# Patient Record
Sex: MALE | Race: ASIAN | NOT HISPANIC OR LATINO | Employment: OTHER | URBAN - METROPOLITAN AREA
[De-identification: names, ages, dates, MRNs, and addresses within clinical notes are randomized per-mention and may not be internally consistent; named-entity substitution may affect disease eponyms.]

---

## 2017-07-31 ENCOUNTER — OFFICE VISIT (OUTPATIENT)
Dept: ORTHOPEDICS | Facility: CLINIC | Age: 42
End: 2017-07-31
Payer: COMMERCIAL

## 2017-07-31 VITALS
HEIGHT: 65 IN | SYSTOLIC BLOOD PRESSURE: 112 MMHG | WEIGHT: 155 LBS | DIASTOLIC BLOOD PRESSURE: 66 MMHG | BODY MASS INDEX: 25.83 KG/M2

## 2017-07-31 DIAGNOSIS — M25.521 RIGHT ELBOW PAIN: ICD-10-CM

## 2017-07-31 DIAGNOSIS — M77.01 MEDIAL EPICONDYLITIS OF RIGHT ELBOW: Primary | ICD-10-CM

## 2017-07-31 PROCEDURE — 99213 OFFICE O/P EST LOW 20 MIN: CPT | Performed by: PHYSICAL MEDICINE & REHABILITATION

## 2017-07-31 NOTE — PATIENT INSTRUCTIONS
We addressed the following today:    1. Right medial epicondylitis (golfer's elbow)    Activity modification as discussed  Hand therapy: [facility] - [phone]  Topical Treatments: Ice  Over the counter medication: Acetaminophen (Tylenol) 1000 mg every 6 hours with food (Maximum of 3000 mg/day)  Ibuprofen (Advil) maximum of 800 mg four times a day with food  Other specific instructions: Obtain over-the-counter elbow strap for further treatment purposes  Follow up in 6 weeks if no improvement of symptoms for further evaluation/medical care (sooner if needed; call direct clinic number [[phone]] at any time with questions or concerns)

## 2017-07-31 NOTE — PROGRESS NOTES
"[facility]   Clinic Visit s Jul 31, 2017    Subjective:  Juan Diego Singh is a 42 year old right-hand dominant male, who is seen as self referral for evaluation of chronic right medial elbow pain.    Symptoms began 2 years ago.  Reports insidious onset without acute precipitating event.  Reports right elbow pain that is located medial with radiation absent.  Pain is 7/10 in maximal severity and 2/10 currently.  Symptoms are generally worse with broomball, golfing, throwing, and with work activities and better with ice.  Other treatment has consisted of compression sleeve with moderate relief.  Denies any consistent numbness/tingling/weakness of the right upper extremity.  Denies any catching or locking of the right elbow.  Denies any history of previous right elbow injuries/surgeries.    Patient's past medical, surgical, social, and family histories are reviewed today.  There are no significant contributory medical issues  Past Medical History:   Diagnosis Date     Other acne      Objective:  /66  Ht 5' 5\" (1.651 m)  Wt 155 lb (70.3 kg)  BMI 25.79 kg/m2  General: healthy, alert, and in no distress  Skin: no suspicious lesions or rashes  Psych: mentation appears normal and affect normal/bright  HEENT: no scleral icterus  CV: no pedal edema  Resp: normal respiratory effort without conversational dyspnea   Neuro: motor strength as noted below  Lymph: no palpable lymphadenopathy    MSK:    RIGHT ELBOW  Inspection:    No swelling, bruising, discoloration, or obvious deformity or asymmetry  Palpation:    No tenderness over the lateral epicondyle, common extensor tendon, medial epicondyle, common flexor tendon, supracondylar notch, olecranon bursa, distal bicep tendon, or radial head/neck  Active Range of Motion:     Extension normal / flexion normal / pronation normal / supination normal  Strength:    Elbow flexion/extension - 5/5  Special Tests:    Positive: Pain with resisted wrist flexion, " passive valgus stress, and pain with resisted pronation    Negative: Pain with resisted wrist extension, pain with resisted middle finger extension, and pain with resisted supination    Imaging:  No x-rays indicated during today's visit    ASSESSMENT:  1. Right medial epicondylosis    PLAN:  1. Formal hand therapy - eccentric wrist flexor/extensor strengthening/stretching and scapular stabilization exercises with use of modalities (kinesiotaping, iontophoresis, etc.) as needed with home exercise prescription.  2. Activity modification as discussed, including limitation of activities that cause pain/discomfort.  3. Acetaminophen/Ibuprofen/ice as needed for improved pain control.  4. Obtain an over-the-counter elbow strap as discussed for further treatment purposes.  5. Follow-up in 6 weeks for further evaluation/medical care.  If symptoms resolve completely, can follow-up as needed.  Consider plain radiographs of the right elbow, diagnostic musculoskeletal ultrasound of the right medial elbow region, initiation of Nitroglycerin patch, etc. as deemed appropriate moving forward.  Instructed to contact our office should the condition evolve or worsen.    Patient's conditions were thoroughly discussed during today's visit with greater than 50% of the visit spent counseling the patient with total time spent face-to-face with the patient being 15 minutes.    Johnny Hall DO, [facility]  [facility]    Disclaimer: This note consists of symbols derived from keyboarding, dictation and/or voice recognition software. As a result, there may be errors in the script that have gone undetected. Please consider this when interpreting information found in this chart.

## 2017-07-31 NOTE — NURSING NOTE
"Chief Complaint   Patient presents with     Musculoskeletal Problem     chronic R medial elbow pain       Initial /66  Ht 5' 5\" (1.651 m)  Wt 155 lb (70.3 kg)  BMI 25.79 kg/m2 Estimated body mass index is 25.79 kg/(m^2) as calculated from the following:    Height as of this encounter: 5' 5\" (1.651 m).    Weight as of this encounter: 155 lb (70.3 kg).  Medication Reconciliation: complete     Pedro Armstrong, ATC    "

## 2017-07-31 NOTE — MR AVS SNAPSHOT
After Visit Summary   7/31/2017    Juan Diego ERICKA Singh    MRN: [id number]           Patient Information     Date Of Birth          1975        Visit Information        Provider Department      7/31/2017 9:40 AM Johnny Hall DO [facility]        Today's Diagnoses     Medial epicondylitis of right elbow    -  1    Right elbow pain          Care Instructions    We addressed the following today:    1. Right medial epicondylitis (golfer's elbow)    Activity modification as discussed  Hand therapy: [facility] - [phone]  Topical Treatments: Ice  Over the counter medication: Acetaminophen (Tylenol) 1000 mg every 6 hours with food (Maximum of 3000 mg/day)  Ibuprofen (Advil) maximum of 800 mg four times a day with food  Other specific instructions: Obtain over-the-counter elbow strap for further treatment purposes  Follow up in 6 weeks if no improvement of symptoms for further evaluation/medical care (sooner if needed; call direct clinic number [[phone]] at any time with questions or concerns)              Follow-ups after your visit        Who to contact     If you have questions or need follow up information about today's clinic visit or your schedule please contact [facility] directly at [phone].  Normal or non-critical lab and imaging results will be communicated to you by Sinobpohart, letter or phone within 4 business days after the clinic has received the results. If you do not hear from us within 7 days, please contact the clinic through [employer] or phone. If you have a critical or abnormal lab result, we will notify you by phone as soon as possible.  Submit refill requests through [employer] or call your pharmacy and they will forward the refill request to us. Please allow 3 business days for your refill to be completed.          Additional Information About Your Visit        [employer] Information     [employer] lets you send messages to  "your doctor, view your test results, renew your prescriptions, schedule appointments and more. To sign up, go to [email or web address] . Click on \"Log in\" on the left side of the screen, which will take you to the Welcome page. Then click on \"Sign up Now\" on the right side of the page.     You will be asked to enter the access code listed below, as well as some personal information. Please follow the directions to create your username and password.     Your access code is: [email or web address]  Expires: 10/29/2017 10:15 AM     Your access code will  in 90 days. If you need help or a new code, please call your [facility] clinic or [phone].        Care EveryWhere ID     This is your Care EveryWhere ID. This could be used by other organizations to access your [facility] medical records  [id number]        Your Vitals Were     Height BMI (Body Mass Index)                5' 5\" (1.651 m) 25.79 kg/m2           Blood Pressure from Last 3 Encounters:   17 112/66   10/05/16 (!) 122/92   06/08/15 (!) 128/96    Weight from Last 3 Encounters:   17 155 lb (70.3 kg)   10/05/16 150 lb (68 kg)   06/08/15 155 lb (70.3 kg)              Today, you had the following     No orders found for display       Primary Care Provider Office Phone # Fax #    Marcie Dooleyet [phone] [address] [facility] [place] [address]        Equal Access to Services     [facility]: Hadii frankie valerao Sofiliberto, waaxda luqadaha, qaybta kaalmada adewil, luz baez . So [facility] [phone].    ATENCIÓN: Si habla español, tiene a casey disposición servicios gratuitos de asistencia lingüística. Llame al [phone].    We comply with applicable federal civil rights laws and [place] laws. We do not discriminate on the basis of race, color, national origin, age, disability sex, sexual orientation or gender identity.            Thank you!     Thank you for choosing [facility]  for your care. Our goal is always to provide you with excellent care. Hearing back from our patients is one way we can continue to improve our services. Please take a few minutes to complete the written survey that you may receive in the mail after your visit with us. Thank you!             Your Updated Medication List - Protect others around you: Learn how to safely use, store and throw away your medicines at www.disposemymeds.org.      Notice  As of 7/31/2017 10:15 AM    You have not been prescribed any medications.

## 2017-10-16 ENCOUNTER — THERAPY VISIT (OUTPATIENT)
Dept: OCCUPATIONAL THERAPY | Facility: CLINIC | Age: 42
End: 2017-10-16
Payer: COMMERCIAL

## 2017-10-16 DIAGNOSIS — M77.01 MEDIAL EPICONDYLITIS OF RIGHT ELBOW: ICD-10-CM

## 2017-10-16 DIAGNOSIS — M25.521 RIGHT ELBOW PAIN: ICD-10-CM

## 2017-10-16 PROCEDURE — 97165 OT EVAL LOW COMPLEX 30 MIN: CPT | Mod: GO | Performed by: OCCUPATIONAL THERAPIST

## 2017-10-16 PROCEDURE — 97110 THERAPEUTIC EXERCISES: CPT | Mod: GO | Performed by: OCCUPATIONAL THERAPIST

## 2017-10-16 PROCEDURE — 97140 MANUAL THERAPY 1/> REGIONS: CPT | Mod: GO | Performed by: OCCUPATIONAL THERAPIST

## 2017-10-16 NOTE — PROGRESS NOTES
Hand Therapy Initial Evaluation  Current Date:  10/16/2017    Subjective:  Juan Diego Singh is a 42 year old right hand dominant male.    Diagnosis: R MEP  DOI:  5+ years ago (MD order date 7/31/17)    Patient reports symptoms of pain, stiffness/loss of motion and numbness of the right elbow which occurred due to swinging motion with playing broom ball, golfing, work. Since onset symptoms are unchanged. Special tests:  none.  Previous treatment: arm sleeve, ice if using it a lot. General health as reported by patient is good.  Pertinent medical history includes: none.  Medical allergies: none.  Surgical history: orthopedic: ankle.  Medication history: none.    Occupational Profile Information:  Current occupation is j[employer]  Currently working in normal job without restrictions  Job Tasks: prolonged standing, repetitive tasks  Prior functional level:  no limitations  Barriers include:none  Mobility: No difficulty  Transportation: drives  Leisure activities/hobbies: broom ball, golf    Upper Extremity Functional Index Score:  SCORE:   Column Totals: /80: 65   (A lower score indicates greater disability.)    Objective:  Pain Level Report: On scale 0-10/10  Date 10/16/17    Side R    Overall 4    At Rest 0    With Activity 6      Primary Report: location and description  Date 10/16/2017    Side R    Location MEP    Radiation none    Pain Quality Dull ache    Frequency Intermittent    Duration Same throughout the day    Exacerbated by  Lifting, gripping,  pinching, pushing, biceps curls    Relieved by Ice, stretching, rest    Progression since onset Unchanged      Palaption: pain scale of 0-10/10  Date 10/16/2017    Side R    MEP 7-8    Flexor Wad Origin 8    PT 0      Palpation Ulnar Nerve Path: Pain level report on scale 0-10/10  Date 10/16/2017    Side R    [place] -    Cubital Tunnel -    [place] -    Subscapularis NT      Sensation: pt reports intermittent numbness on the ulnar volar forearm that occurs  at work after releasing and bringing arm back down after use    MMT: MMT scale of 0-5/5, pain scale of 0-10/10  Date 10/16/2017    Side R    Elbow flex in neutral 5/5, 0/10    Elbow flex in supination 5/5, 2/10    Wrist flexion 5/5, 7/10    Pronation 4/5, 9/10      STRENGTH: (Measured in pounds, pain scale 0-10/10)    Date 10/16/2017        Trials Left Right Left Right Left Right Left Right Left Right Left Right   1 87 87             2               3               Avg               Pain  0               Assessment:  Patient presents with symptoms consistent with diagnosis of right Medial Epicondylitis,  with conservative intervention.     Patient's limitations or Problem List includes:  Pain, Weakness, Decreased , Decreased coordination, Decreased dexterity and Tightness in musculature of the right elbow which interferes with the patient's ability to perform Self Care Tasks (dressing, eating, bathing, hygiene/toileting), Recreational Activities and Household Chores as compared to previous level of function.    Rehab Potential:  Excellent - Return to full activity, no limitations    Patient will benefit from skilled Occupational Therapy to increase flexibility, overall strength,  strength, forearm strength, coordination and dexterity and decrease pain to return to previous activity level and resume normal daily tasks and to reach their rehab potential.    Barriers to Learning:  No barrier    Communication Issues:  Patient appears to be able to clearly communicate and understand verbal and written communication and follow directions correctly.    Chart Review: Chart Review, Brief history including review of medical and/or therapy records relating to the presenting problem and Simple history review with patient    Assessment of Occupational Performance:  1-3 Performance Deficits  Identified Performance Deficits: home establishment and management, meal preparation and cleanup and leisure activities       Clinical Decision Making (Complexity): Low complexity    Treatment Explanation:  The following has been discussed with the patient:  RX ordered/plan of care  Anticipated outcomes  Possible risks and side effects    Plan:  Frequency:  1 X week, once daily  Duration:  for 6 weeks     Treatment Plan:    Modalities:  US   Therapeutic Exercise: AROM of elbow and wrist, PROM with stretch to wrist extensors and flexors, Isometrics, Eccentric Isotonics.   Manual Techniques: Joint mobilization and Myofascial release, interosseous membrane glide,  Myofascial release of the forearm extensors and flexors  Neuro Re-ed: Active/Passive ulnar nerve glides  Orthosis:  Static orthosis and Forearm based orthosis    Discharge Plan:  Achieve all LTG.  Independent in home treatment program.  Reach maximal therapeutic benefit.    Home Exercise Program:  Flexor and extensor wad stretches, pec stretch  MFR to flexors  Ulnar nerve glide    Next Visit:  MFR  Passive pec minor stretch  Passive ulnar nerve glides  Passive stretch

## 2017-10-17 PROBLEM — M25.521 RIGHT ELBOW PAIN: Status: ACTIVE | Noted: 2017-10-17

## 2017-10-17 PROBLEM — M77.01 MEDIAL EPICONDYLITIS OF RIGHT ELBOW: Status: ACTIVE | Noted: 2017-10-17

## 2017-10-30 ENCOUNTER — THERAPY VISIT (OUTPATIENT)
Dept: OCCUPATIONAL THERAPY | Facility: CLINIC | Age: 42
End: 2017-10-30
Payer: COMMERCIAL

## 2017-10-30 DIAGNOSIS — M25.521 RIGHT ELBOW PAIN: ICD-10-CM

## 2017-10-30 DIAGNOSIS — M77.01 MEDIAL EPICONDYLITIS OF RIGHT ELBOW: ICD-10-CM

## 2017-10-30 PROCEDURE — 97112 NEUROMUSCULAR REEDUCATION: CPT | Mod: GO | Performed by: OCCUPATIONAL THERAPIST

## 2017-10-30 PROCEDURE — 97140 MANUAL THERAPY 1/> REGIONS: CPT | Mod: GO | Performed by: OCCUPATIONAL THERAPIST

## 2017-10-30 PROCEDURE — 97110 THERAPEUTIC EXERCISES: CPT | Mod: GO | Performed by: OCCUPATIONAL THERAPIST

## 2017-11-13 ENCOUNTER — THERAPY VISIT (OUTPATIENT)
Dept: OCCUPATIONAL THERAPY | Facility: CLINIC | Age: 42
End: 2017-11-13
Payer: COMMERCIAL

## 2017-11-13 DIAGNOSIS — M77.01 MEDIAL EPICONDYLITIS OF RIGHT ELBOW: ICD-10-CM

## 2017-11-13 DIAGNOSIS — M25.521 RIGHT ELBOW PAIN: ICD-10-CM

## 2017-11-13 PROCEDURE — 97112 NEUROMUSCULAR REEDUCATION: CPT | Mod: GO | Performed by: OCCUPATIONAL THERAPIST

## 2017-11-13 PROCEDURE — 97110 THERAPEUTIC EXERCISES: CPT | Mod: GO | Performed by: OCCUPATIONAL THERAPIST

## 2017-11-13 PROCEDURE — 97140 MANUAL THERAPY 1/> REGIONS: CPT | Mod: GO | Performed by: OCCUPATIONAL THERAPIST

## 2018-02-12 ENCOUNTER — PRE VISIT (OUTPATIENT)
Dept: DERMATOLOGY | Facility: CLINIC | Age: 43
End: 2018-02-12

## 2018-02-12 NOTE — TELEPHONE ENCOUNTER
VM identifies patient. Left message for patient regarding upcoming appointment on 3-1-18 at 2:45pm.  Informed patient to bring an updated list of allergies, medications, pharmacy details and insurance information. Asked patient to bring any dermatology records from outside [place] or the [facility] or have them faxed to [phone].    Patient Reminders Given:  --Plan on being in our facility for approximately one hour, this includes the registration process, office visit, education and check-out process.    --We are located on the second floor of the building, check in desk D.   --If you need to cancel or reschedule, call [phone].  --We look forward to seeing you in Dermatology Clinic.     Lorri Jones LPN

## 2018-03-07 PROBLEM — M25.521 RIGHT ELBOW PAIN: Status: RESOLVED | Noted: 2017-10-17 | Resolved: 2018-03-07

## 2018-03-07 PROBLEM — M77.01 MEDIAL EPICONDYLITIS OF RIGHT ELBOW: Status: RESOLVED | Noted: 2017-10-17 | Resolved: 2018-03-07

## 2018-03-08 NOTE — PROGRESS NOTES
Pt has not returned for therapy since 11/13/17.  Assume all goals are met to pt satisfaction.  D/C [facility].

## 2024-06-14 ENCOUNTER — THERAPY VISIT (OUTPATIENT)
Dept: PHYSICAL THERAPY | Facility: CLINIC | Age: 49
End: 2024-06-14
Payer: COMMERCIAL

## 2024-06-14 DIAGNOSIS — M62.838 MUSCLE SPASM: ICD-10-CM

## 2024-06-14 DIAGNOSIS — M54.50 ACUTE BILATERAL LOW BACK PAIN WITHOUT SCIATICA: Primary | ICD-10-CM

## 2024-06-14 PROCEDURE — 97161 PT EVAL LOW COMPLEX 20 MIN: CPT | Mod: GP | Performed by: PHYSICAL THERAPIST

## 2024-06-14 PROCEDURE — 97140 MANUAL THERAPY 1/> REGIONS: CPT | Mod: GP | Performed by: PHYSICAL THERAPIST

## 2024-06-14 NOTE — PROGRESS NOTES
PHYSICAL THERAPY EVALUATION  Type of Visit: Evaluation       Fall Risk Screen:  Fall screen completed by: PT  Have you fallen 2 or more times in the past year?: No  Have you fallen and had an injury in the past year?: No  Is patient a fall risk?: No    Subjective       Presenting condition or subjective complaint: Injured back jogging to the point afterwarss i had touble walking, taking off clothes, and getting down and up.  Date of onset:      Relevant medical history:     Dates & types of surgery:      Prior diagnostic imaging/testing results: Other None   Prior therapy history for the same diagnosis, illness or injury: No      Prior Level of Function  Transfers: Independent  Ambulation: Independent  ADL: Independent      Living Environment  Social support: With a significant other or spouse   Type of home: House   Stairs to enter the home: No       Ramp: No   Stairs inside the home: Yes 12 Is there a railing: Yes     Help at home: None  Equipment owned:       Employment: Yes [employer]  Hobbies/Interests: Working out.  Golf.    Patient goals for therapy: Workout.  Golf  Present symptoms: patient reports pain located in the right low back .    Radiation:none  Type of pain is described as sharp on R, aching/tight .   Associated symptoms include: none (parasthesia)  Present since: Winter 2023, Spring 2024 with symptoms R side worsening, L side unchanging (improving/unchanging/worsening).  This condition is new/chronic (new/recurrent/chronic).  Symptoms commenced as a result of: running, felt he misstepped running; retriggered R after starting golfing in the last two months; reinjured this past Sunday just jogging  Condition occurred in the following environment: community   Symptoms at onset: R low back, now in L side  Constant symptoms:  Intermittent symptoms: all sx    Cough/Sneeze/Strain:+ sneeze    (with consideration for bending, sitting/rising, standing, walking, lying, am, as the day progresses, pm, when still,  on the move, other )  Symptoms are made worse with the following: standing, bending, twisting,    Symptoms are made better with the following: lying down on back,     Sleep disturbance: transitional movements are bothersome  Sleeping postures: back  Sleeping surface: firm (firm, soft, sag)       Objective   LUMBAR:    Posture:   Sitting: poor; Standing:fair; Lordosis: reduced  Posture Correction: better  Relevant Lateral Shift: nil    Neurological:    Motor Deficit:  Myotomes L R   L1-2 (hip flexion)     L3 (knee extension)     L4 (ankle DF)     L5 (g. toe ext)     S1 (ankle PF or knee flex)       Sensory Deficit and Reflexes: not tested    Dural Signs:   L R   Slump     SLR     Other:     AROM: (Major, Moderate, Minimal or Nil loss)  Movement Loss Kishor Mod Min Nil Pain   Flexion        Extension        Side Gliding R        Side Gliding L          Repeated movement testing:   (During: produces, abolishes, increases, decreases, no effect, centralizing, peripheralizing; After: better, worse, no better, no worse, no effect, centralized, peripheralized)    Pre-test Symptoms Standing:    Symptoms During Symptoms After ROM increased ROM decreased No Effect   FIS        Rep FIS        EIS        Rep EIS        Pre-test Symptoms Lying: painfree    Symptoms During Symptoms After ROM increased ROM decreased No Effect   LAURIE D NB      Rep LAURIE NE NE flex X R SG    EIL P pressure       Rep EIL NE NE  X flex    If required Pre-test Symptoms: painfree   Symptoms During Symptoms After ROM increased ROM decreased No Effect   SGIS - R P NW      Rep SGIS - R P NW   x   SGIS - L        Rep SGIS - L          Static Tests:   Other Tests: +post shear, +Gaenslen's (reduction of pain on R side); +spring    Provisional Classification: other/SIJ dysfunction  Principle of Management (education/equipment/mechanical therapy/specific principle): self correction R post inominate MET; shot gun, bridging       Assessment & Plan   CLINICAL  IMPRESSIONS  Medical Diagnosis: Acute bilateral low back pain without sciatica; muscle spasm    Treatment Diagnosis: SIJ dysfunction   Impression/Assessment: Patient is a 49 year old male with    complaints.  The following significant findings have been identified: Pain, Decreased ROM/flexibility, Decreased joint mobility, Decreased activity tolerance, and Impaired posture. These impairments interfere with their ability to perform work tasks, recreational activities, and community mobility as compared to previous level of function.     Clinical Decision Making (Complexity):  Clinical Presentation: Stable/Uncomplicated  Clinical Presentation Rationale: based on medical and personal factors listed in PT evaluation  Clinical Decision Making (Complexity): Low complexity    PLAN OF CARE  Treatment Interventions:  Interventions: Manual Therapy, Neuromuscular Re-education, Therapeutic Activity, Therapeutic Exercise    Long Term Goals     PT Goal 1  Goal Identifier: sitting  Goal Description: patient will sit in neutral posture (or with lumbar roll as needed) for 2+ hours painfree  Rationale: to maximize safety and independence within the community;to maximize safety and independence with transportation;to maximize safety and independence with self cares  Target Date: 07/26/24      Frequency of Treatment: 1 x week  Duration of Treatment: 8 weeks    Recommended Referrals to Other Professionals:   Education Assessment:        Risks and benefits of evaluation/treatment have been explained.   Patient/Family/caregiver agrees with Plan of Care.     Evaluation Time:     PT Eval, Low Complexity Minutes (15950): 20       Signing Clinician: Diana Multani PT

## 2024-06-17 ENCOUNTER — TRANSCRIBE ORDERS (OUTPATIENT)
Dept: OTHER | Age: 49
End: 2024-06-17

## 2024-06-17 DIAGNOSIS — M54.50 ACUTE BILATERAL LOW BACK PAIN WITHOUT SCIATICA: Primary | ICD-10-CM

## 2024-06-17 DIAGNOSIS — M62.838 MUSCLE SPASM: ICD-10-CM

## 2024-07-03 ENCOUNTER — THERAPY VISIT (OUTPATIENT)
Dept: PHYSICAL THERAPY | Facility: CLINIC | Age: 49
End: 2024-07-03
Payer: COMMERCIAL

## 2024-07-03 DIAGNOSIS — M54.50 ACUTE BILATERAL LOW BACK PAIN WITHOUT SCIATICA: Primary | ICD-10-CM

## 2024-07-03 PROCEDURE — 97110 THERAPEUTIC EXERCISES: CPT | Mod: GP | Performed by: PHYSICAL THERAPIST

## 2024-08-04 ENCOUNTER — HEALTH MAINTENANCE LETTER (OUTPATIENT)
Age: 49
End: 2024-08-04

## 2024-09-26 PROBLEM — M54.50 ACUTE BILATERAL LOW BACK PAIN WITHOUT SCIATICA: Status: RESOLVED | Noted: 2024-06-14 | Resolved: 2024-09-26

## 2024-09-26 NOTE — PROGRESS NOTES
"    DISCHARGE  Reason for Discharge: Patient has failed to schedule further appointments.    Equipment Issued:     Discharge Plan: Patient to continue home program.    Referring Provider:  Milton Borrego     07/03/24 0500   Appointment Info   Signing clinician's name / credentials Diana Multani PT Cert MDT   Total/Authorized Visits 6   Visits Used 2   Medical Diagnosis Acute bilateral low back pain without sciatica; muscle spasm   PT Tx Diagnosis SIJ dysfunction   Progress Note/Certification   Therapy Frequency 1 x week   Predicted Duration 8 weeks   PT Goal 1   Goal Identifier standing   Goal Description patient will be able to stand 4 hours painfree   Rationale to maximize safety and independence with performance of ADLs and functional tasks;to maximize safety and independence within the home;to maximize safety and independence within the community;to maximize safety and independence with self cares   Goal Progress increased stiffness standing at work   Target Date 07/26/24   Subjective Report   Subjective Report reports he feels overall better--feels he can't really tell if the exercises are helping. Worse with standing; Reports walking doesn't bother him as much; will get tight working out;   Objective Measures   Objective Measures Objective Measure 1;Objective Measure 2   Objective Measure 1   Objective Measure SIJ testing   Details +Carlos R SIJ, post inominate on R   Treatment Interventions (PT)   Interventions Therapeutic Procedure/Exercise   Therapeutic Procedure/Exercise   Therapeutic Procedures: strength, endurance, ROM, flexibility minutes (77009) 34   Ther Proc 1 rep hip EXT R in 1/2 kneeling   Ther Proc 1 - Details 3 x 10: P stretch/B/incr Flex, EXT   Therapeutic Procedures Ther Proc 2   Skilled Intervention to restore normal spinal/pelvis mechanics   Patient Response/Progress demo'd incr ROM after   Ther Proc 2 sitting hip flexibility/ROM   Ther Proc 2 - Details 45\" both directions   Total Session Time "   Timed Code Treatment Minutes 34   Total Treatment Time (sum of timed and untimed services) 34

## 2025-08-16 ENCOUNTER — HEALTH MAINTENANCE LETTER (OUTPATIENT)
Age: 50
End: 2025-08-16